# Patient Record
Sex: MALE | Race: WHITE | NOT HISPANIC OR LATINO | Employment: FULL TIME | URBAN - METROPOLITAN AREA
[De-identification: names, ages, dates, MRNs, and addresses within clinical notes are randomized per-mention and may not be internally consistent; named-entity substitution may affect disease eponyms.]

---

## 2022-08-05 ENCOUNTER — OFFICE VISIT (OUTPATIENT)
Dept: URGENT CARE | Facility: CLINIC | Age: 52
End: 2022-08-05

## 2022-08-05 VITALS
HEART RATE: 68 BPM | OXYGEN SATURATION: 98 % | RESPIRATION RATE: 18 BRPM | HEIGHT: 77 IN | BODY MASS INDEX: 27.77 KG/M2 | TEMPERATURE: 98.4 F | DIASTOLIC BLOOD PRESSURE: 80 MMHG | WEIGHT: 235.2 LBS | SYSTOLIC BLOOD PRESSURE: 130 MMHG

## 2022-08-05 DIAGNOSIS — M25.572 ACUTE LEFT ANKLE PAIN: ICD-10-CM

## 2022-08-05 DIAGNOSIS — M10.9 ACUTE GOUT OF LEFT ANKLE, UNSPECIFIED CAUSE: ICD-10-CM

## 2022-08-05 PROCEDURE — 99203 OFFICE O/P NEW LOW 30 MIN: CPT | Performed by: PHYSICIAN ASSISTANT

## 2022-08-05 RX ORDER — PREDNISONE 20 MG/1
TABLET ORAL
Qty: 10 TABLET | Refills: 0 | Status: SHIPPED | OUTPATIENT
Start: 2022-08-05 | End: 2022-08-05

## 2022-08-05 RX ORDER — ALLOPURINOL 300 MG/1
300 TABLET ORAL DAILY
COMMUNITY

## 2022-08-05 RX ORDER — PREDNISONE 20 MG/1
TABLET ORAL
Qty: 10 TABLET | Refills: 0 | Status: SHIPPED | OUTPATIENT
Start: 2022-08-05

## 2022-08-05 RX ORDER — LISINOPRIL 10 MG/1
10 TABLET ORAL DAILY
COMMUNITY

## 2022-08-05 ASSESSMENT — ENCOUNTER SYMPTOMS
NUMBNESS: 0
INABILITY TO BEAR WEIGHT: 0
LOSS OF SENSATION: 0
LOSS OF MOTION: 0
TINGLING: 0
MUSCLE WEAKNESS: 0

## 2022-08-05 NOTE — PROGRESS NOTES
"  Subjective:   Joao Lezama is a 51 y.o. male who presents today with   Chief Complaint   Patient presents with   • Ankle Injury     Left ankle injury/pain/swollen/x 3weeks     Ankle Injury   The incident occurred more than 1 week ago. The injury mechanism was an inversion injury. The pain is present in the left ankle. The quality of the pain is described as aching. The pain is moderate. The pain has been constant since onset. Pertinent negatives include no inability to bear weight, loss of motion, loss of sensation, muscle weakness, numbness or tingling. He has tried nothing for the symptoms. The treatment provided no relief.     [place] ankle about 3 weeks ago and has noticed significant pain and swelling to the area since then.  Patient does have history of gout and is wondering if it could be that.    PMH:  has a past medical history of Cellulitis.  MEDS:   Current Outpatient Medications:   •  lisinopril (PRINIVIL) 10 MG Tab, Take 10 mg by mouth every day., Disp: , Rfl:   •  allopurinol (ZYLOPRIM) 300 MG Tab, Take 300 mg by mouth every day., Disp: , Rfl:   •  predniSONE (DELTASONE) 20 MG Tab, Take 1 tab twice daily for 5 days., Disp: 10 Tablet, Rfl: 0  ALLERGIES:   Allergies   Allergen Reactions   • Latex    • Maple Flavor      SURGHX:   Past Surgical History:   Procedure Laterality Date   • DEBRIDEMENT      metal in left thumb     SOCHX:  reports that he has quit smoking. He has never used smokeless tobacco. He reports that he does not drink alcohol and does not use drugs.  FH: Reviewed with patient, not pertinent to this visit.       Review of Systems   Musculoskeletal:        Left ankle pain   Neurological: Negative for tingling and numbness.        Objective:   /80 (BP Location: Left arm, Patient Position: Sitting)   Pulse 68   Temp 36.9 °C (98.4 °F) (Temporal)   Resp 18   Ht 1.956 m (6' 5\")   Wt 107 kg (235 lb 3.2 oz)   SpO2 98% "   BMI 27.89 kg/m²   Physical Exam  Vitals and nursing note reviewed.   Constitutional:       General: He is not in acute distress.     Appearance: Normal appearance. He is well-developed. He is not ill-appearing or toxic-appearing.   HENT:      Head: Normocephalic and atraumatic.      Right Ear: Hearing normal.      Left Ear: Hearing normal.   Eyes:      Pupils: Pupils are equal, round, and reactive to light.   Cardiovascular:      Rate and Rhythm: Normal rate.   Pulmonary:      Effort: Pulmonary effort is normal.   Musculoskeletal:      Left ankle:      Left Achilles Tendon: Normal.        Legs:         Feet:       Comments: Significant swelling to the lateral malleolus of the left ankle.  Tenderness palpation of the area.  Patient is able to plantarflex and dorsiflex but does have pain with doing so.  Neurovascular intact distally.  Less than 2 capillary fill.   Skin:     General: Skin is warm and dry.      Comments: No open wounds, erythema or abrasions to the left foot.   Neurological:      Mental Status: He is alert.      Coordination: Coordination normal.   Psychiatric:         Mood and Affect: Mood normal.       DX ANKLE  FINDINGS:  Bone mineralization is age appropriate. Bony alignment is anatomic. There is no evidence of acute fracture or dislocation. The ankle mortise is symmetric. There is diffuse ankle soft tissue swelling.     IMPRESSION:     Diffuse ankle soft tissue swelling without underlying acute osseous abnormality.    Assessment/Plan:   Assessment    1. Acute left ankle pain  - DX-ANKLE 3+ VIEWS LEFT; Future    2. Acute gout of left ankle, unspecified cause  - predniSONE (DELTASONE) 20 MG Tab; Take 1 tab twice daily for 5 days.  Dispense: 10 Tablet; Refill: 0    Other orders  - lisinopril (PRINIVIL) 10 MG Tab; Take 10 mg by mouth every day.  - allopurinol (ZYLOPRIM) 300 MG Tab; Take 300 mg by mouth every day.  Symptoms and presentation are consistent with significant swelling to the left lateral  ankle.  Could possibly be gout flared from initial injury 3 weeks ago.  Recommend Ace wrap as provided today and also ice and rest as he has been.  We will also send in a steroid short course to see if this alleviates his symptoms.  Offered Ortho referral for patient today but he declines.  Differential diagnosis, natural history, supportive care, and indications for immediate follow-up discussed.   Patient given instructions and understanding of medications and treatment.    If not improving in 3-5 days, F/U with PCP or return to UC if symptoms worsen.    Patient agreeable to plan.      Please note that this dictation was created using voice recognition software. I have made every reasonable attempt to correct obvious errors, but I expect that there are errors of grammar and possibly content that I did not discover before finalizing the note.    Norberto Clarke PA-C